# Patient Record
(demographics unavailable — no encounter records)

---

## 2017-01-07 NOTE — CT
EXAM DESCRIPTION:

CT ABDOMEN AND PELVIS WITHOUT INTRAVENOUS CONTRAST



CLINICAL HISTORY:

Left flank pain and hematuria.



COMPARISON:

None



TECHNIQUE:

CT of the abdomen and pelvis was performed without intravenous contrast.

Oral contrast was not given.



FINDINGS:

The lung bases included on the exam demonstrate no acute findings.



The liver, gallbladder, pancreas, spleen and the bilateral adrenal glands

are normal in appearance, given the limitation of the lack of IV contrast.



The kidneys reveal no evidence of calculi or hydronephrosis.



The bilateral ureters are unremarkable.



The urinary bladder is unremarkable.



The small bowel is unremarkable.



There is no CT evidence of acute appendicitis



There is no CT evidence of acute diverticulitis, colitis or large bowel

obstruction.



There is no pathological retroperitoneal or pelvic lymphadenopathy, free

fluid or free air.



There is no clinically significant aneurysmal dilatation of the abdominal

aorta.



 There is no CT evidence of any clinically significant inguinal or

ventral hernia.  The uterus appears to be surgically absent.



The visualized lower thoracic and the lumbar spine shows underlying

multilevel mild degenerative change.



The remainder of the pelvic structures appear unremarkable.



IMPRESSION:

There are no acute or significant findings on the current study



Electronically signed by: Len Gomez MD 01/07/2017 16:00

## 2017-01-07 NOTE — ED.PDOC
History of Present Illness





- General


Chief Complaint: Abdominal Pain


Stated Complaint: abdominal discomfort


Time Seen by Provider: 01/07/17 12:26


Information Source: patient, RN notes reviewed, Vital Signs reviewed, family


Exam Limitations: no limitations





- History of Present Illness


Initial Comments: 


This 49 y/o female bent over last night and since has had severe LLQ pain which 

radiates to her left flank.  She also has nausea when she stands.  No vomiting.





Abdominal Pain Onset Location: LLQ


Pain Radiation: flank


Quality: stabbing


Timing/Duration: other - since last night\


Improving Factors: immobilization


Worsening Factors: movement, other - standing


Associated Symptoms: back pain, nausea/vomiting





Review of Systems





- Review of Systems


Constitutional: States: chills, fever


EENTM: States: no symptoms reported


Respiratory: States: no symptoms reported


Cardiology: States: no symptoms reported


Gastrointestinal/Abdominal: States: abdominal pain, nausea


Genitourinary: States: no symptoms reported


Musculoskeletal: States: back pain


Skin: States: no symptoms reported


Neurological: States: no symptoms reported


Endocrine: States: no symptoms reported


Hematologic/Lymphatic: States: no symptoms reported





Past Medical History (General)





- Patient Medical History


Hx Stroke: No


Hx Congestive Heart Failure: No


Hx Hypertension: Yes


Hx Diabetes: Yes


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


Hx MRSA: No





- Vaccination History


Hx Influenza Vaccination: No


Hx Pneumococcal Vaccination: No





- Social History


Hx Tobacco Use: No





Family Medical History





- Family History


  ** Mother


Living Status: Still Living


Hx Family Hypertension: Yes


Hx Family Cancer: Yes - Breast





Physical Exam





- Physical Exam


General Appearance: Alert, Obvious distress, Well Groomed


Eyes, Ears, Nose, Throat Exam: normal ENT inspection


Respiratory: lungs clear, normal breath sounds, no respiratory distress, no 

accessory muscle use


Cardiovascular/Chest: regular rate, rhythm, no edema, no murmur


Gastrointestinal/Abdominal: normal bowel sounds, non tender, soft


Back Exam: normal inspection, no CVA tenderness, no vertebral tenderness


Extremity: normal range of motion, normal inspection


Neurologic: alert, normal mood/affect, oriented x 3


Skin Exam: normal color, warm/dry





Progress





- Results/Orders


Results/Orders: 


 











  01/07/17





  12:24


 


Temperature 98.6 F


 


Pulse Rate [ 89





Left Radial] 


 


Respiratory 18





Rate 


 


Blood Pressure 119/69





[Left Arm] 


 


O2 Sat by Pulse 99





Oximetry 














 Laboratory Results











WBC  6.3 K/mm3 (4.8-10.8)   01/07/17  12:48    


 


RBC  4.49 M/mm3 (4.20-5.40)   01/07/17  12:48    


 


Hgb  13.5 gm/dL (12.0-16.0)   01/07/17  12:48    


 


Hct  40.6 % (36.0-47.0)   01/07/17  12:48    


 


MCV  90.4 fl (81.0-99.0)   01/07/17  12:48    


 


MCH  30.0 pg (27.0-31.0)   01/07/17  12:48    


 


MCHC  33.3 g/dL (33.0-37.0)   01/07/17  12:48    


 


RDW  13.5 % (11.5-14.5)   01/07/17  12:48    


 


Plt Count  210 K/mm3 (130-400)   01/07/17  12:48    


 


MPV  8.6 fl (7.40-10.4)   01/07/17  12:48    


 


Absolute Neuts (auto)  5.10 K/uL (1.8-6.8)   01/07/17  12:48    


 


Absolute Lymphs (auto)  0.90 K/uL (1.0-3.4)  L  01/07/17  12:48    


 


Absolute Monos (auto)  0.30 K/uL (0.2-0.8)   01/07/17  12:48    


 


Absolute Eos (auto)  0.00 K/uL (0.0-0.4)   01/07/17  12:48    


 


Absolute Basos (auto)  0.00 K/uL (0.0-0.1)   01/07/17  12:48    


 


Neutrophils %  80.5 % (42.0-78.0)  H  01/07/17  12:48    


 


Lymphocytes %  14.5 % (20.0-50.0)  L  01/07/17  12:48    


 


Monocytes %  4.2 % (2.0-9.0)   01/07/17  12:48    


 


Eosinophils %  0.4 % (1.0-5.0)  L  01/07/17  12:48    


 


Basophils %  0.4 % (0.0-2.0)   01/07/17  12:48    


 


Sodium  136 mmol/L (135-145)   01/07/17  12:48    


 


Potassium  3.5 mmol/L (3.6-5.0)  L  01/07/17  12:48    


 


Chloride  100 mmol/L (101-111)  L  01/07/17  12:48    


 


Carbon Dioxide  28 mmol/L (21-31)   01/07/17  12:48    


 


Anion Gap  11.5  (12-18)  L  01/07/17  12:48    


 


BUN  15 mg/dL (7-18)   01/07/17  12:48    


 


Creatinine  0.72 mg/dL (0.6-1.3)   01/07/17  12:48    


 


BUN/Creatinine Ratio  20.8  (10-20)  H  01/07/17  12:48    


 


Random Glucose  119 mg/dL ()  H  01/07/17  12:48    


 


Serum Osmolality  273.9 mOsm/L (275-295)  L  01/07/17  12:48    


 


Calcium  9.7 mg/dL (8.4-10.2)   01/07/17  12:48    


 


Total Bilirubin  0.8 mg/dL (0.2-1.0)   01/07/17  12:48    


 


AST  25 IU/L (10-42)   01/07/17  12:48    


 


ALT  25 IU/L (10-60)   01/07/17  12:48    


 


Alkaline Phosphatase  44 IU/L ()   01/07/17  12:48    


 


Serum Total Protein  6.7 gm/dL (6.4-8.2)   01/07/17  12:48    


 


Albumin  4.0 g/dl (3.2-5.5)   01/07/17  12:48    


 


Globulin  2.7 gm/dL (2.3-3.5)   01/07/17  12:48    


 


Albumin/Globulin Ratio  1.5  (1.1-1.9)   01/07/17  12:48    


 


Urine Color  Yellow  (Yellow)   01/07/17  13:43    


 


Urine Appearance  Clear  (Clear)   01/07/17  13:43    


 


Urine pH  7.0  (4.5-7.8)   01/07/17  13:43    


 


Ur Specific Gravity  1.015  (1.005-1.030)   01/07/17  13:43    


 


Urine Protein  Negative mg/dL  01/07/17  13:43    


 


Urine Glucose (UA)  Negative mg/dL (Negative)   01/07/17  13:43    


 


Urine Ketones  Negative mg/dL (NEGATIVE)   01/07/17  13:43    


 


Urine Blood  Trace-intact  (Negative)  H  01/07/17  13:43    


 


Urine Nitrite  Negative   01/07/17  13:43    


 


Urine Bilirubin  Negative  (NEGATIVE)   01/07/17  13:43    


 


Urine Urobilinogen  0.2 mg/dL (0.2-1.0)   01/07/17  13:43    


 


Ur Leukocyte Esterase  Negative  (Negative)   01/07/17  13:43    


 


Urine RBC  3-5 /hpf H  01/07/17  13:43    


 


Urine WBC  1-3 /hpf  01/07/17  13:43    


 


Ur Epithelial Cells  5-10 /hpf  01/07/17  13:43    


 


Urine Bacteria  2+  H  01/07/17  13:43    














- EKG/XRAY/CT


CT: Abd/Pelvis:  No obvious cause for pain--nml


CT Ordered: Yes


CT Interpretation Call Back: No - Report sent


CT Interpretation Call Back Date: 01/07/17


CT Interpretation Call Back Time: 16:05





Departure





- Departure


Clinical Impression: 


Strain of left hip


Qualifiers:


 Encounter type: initial encounter Qualifier Code: (S76.012A) Strain of muscle, 

fascia and tendon of left hip, initial encounter


Time of Disposition: 16:36


Disposition: Discharge to Home or Self Care


Departure Forms:  ED Discharge - Pt. Copy, Patient Portal Self Enrollment


Diet: resume usual diet


Prescriptions: 


Metaxalone 800 mg PO TID PRN #30 tab


 PRN Reason: Pain


Home Medications: 


Ambulatory Orders





Acyclovir [Zovirax] 400 mg PO BID PRN 01/07/17 


Hydrochlorothiazide 25 mg PO DAILY 01/07/17 


Metaxalone 800 mg PO TID PRN #30 tab 01/07/17 


metFORMIN HCL [Glucophage] 500 mg PO DAILY 01/07/17 








Additional Instructions: 


Follow up with PCP if symptoms persist or worsen